# Patient Record
Sex: MALE
[De-identification: names, ages, dates, MRNs, and addresses within clinical notes are randomized per-mention and may not be internally consistent; named-entity substitution may affect disease eponyms.]

---

## 2018-02-10 ENCOUNTER — HOSPITAL ENCOUNTER (EMERGENCY)
Dept: HOSPITAL 45 - C.EDB | Age: 23
Discharge: HOME | End: 2018-02-10
Payer: COMMERCIAL

## 2018-02-10 VITALS — OXYGEN SATURATION: 97 % | HEART RATE: 95 BPM | SYSTOLIC BLOOD PRESSURE: 111 MMHG | DIASTOLIC BLOOD PRESSURE: 81 MMHG

## 2018-02-10 VITALS
WEIGHT: 190.92 LBS | BODY MASS INDEX: 29.97 KG/M2 | HEIGHT: 67.01 IN | WEIGHT: 190.92 LBS | BODY MASS INDEX: 29.97 KG/M2 | HEIGHT: 67.01 IN

## 2018-02-10 VITALS — OXYGEN SATURATION: 97 % | TEMPERATURE: 98.24 F

## 2018-02-10 DIAGNOSIS — S09.90XA: ICD-10-CM
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
## 2018-02-10 DIAGNOSIS — F10.129: Primary | ICD-10-CM

## 2018-02-10 DIAGNOSIS — S00.83XA: ICD-10-CM

## 2018-02-10 LAB
BUN SERPL-MCNC: 12 MG/DL (ref 7–18)
CALCIUM SERPL-MCNC: 8.2 MG/DL (ref 8.5–10.1)
CO2 SERPL-SCNC: 26 MMOL/L (ref 21–32)
CREAT SERPL-MCNC: 0.84 MG/DL (ref 0.6–1.4)
GLUCOSE SERPL-MCNC: 112 MG/DL (ref 70–99)
POTASSIUM SERPL-SCNC: 3.3 MMOL/L (ref 3.5–5.1)
SODIUM SERPL-SCNC: 140 MMOL/L (ref 136–145)

## 2018-02-10 NOTE — DIAGNOSTIC IMAGING REPORT
HEAD WITHOUT CONTRAST (CT)



CLINICAL HISTORY: 22 years-old Male presenting with Heavily intoxicated with

bruising/abrasion to right forehead, possible head injury. 



TECHNIQUE: Multidetector CT imaging of the head was performed without the use of

intravenous contrast. IV contrast: None. A dose lowering technique was used

consistent with the principles of ALARA (as low as reasonably achievable). 



COMPARISON: 8/25/2016.



CT DOSE (mGy.cm): The estimated cumulative dose is 614.27 mGy.cm.



FINDINGS: 



 topogram: Unremarkable.



Ventricles and sulci normal in size. Brain parenchyma normal in appearance with

preserved gray-white differentiation. No mass effect or midline shift. No

hemorrhage or acute territorial infarct. No extra-axial fluid collection.

Mucosal thickening in the right maxillary sinus with aerated secretions,

partially visualized. Minimal right frontal soft tissue swelling and subgaleal

fluid, possible diminutive hematoma.



IMPRESSION:

1.  No acute intracranial abnormality. 



2.  Minimal superficial contusion and hematoma in the right frontal region.



3.  Possible acute right maxillary sinusitis evidence by aerated secretions,

only partially visualized on the CT head. Correlate clinically.







Electronically signed by:  Stanislaw Casanova M.D.

2/10/2018 6:59 AM



Dictated Date/Time:  2/10/2018 6:55 AM

## 2018-02-10 NOTE — EMERGENCY ROOM VISIT NOTE
History


Report prepared by Gerardo:  Brent Hernández


Under the Supervision of:  Dr. Roe Robbins M.D.


First contact with patient:  03:31


Chief Complaint:  ALCOHOL OVERDOSE


Stated Complaint:  ALCOHOL OVERDOSE





History of Present Illness


HPI is limited due to altered mental state secondary to alcohol intoxication. 

The patient is a 22 year old male who presents to the Emergency Room with 

complaints of a recent alcohol overdose. Nurse states that the patient was 

found heavily intoxicated stumbling down a hallway. Patient states that he only 

drank alcohol tonight.





   Source of History:  patient


   History Limited By:  AMS (Secondary to alcohol intoxication)





Review of Systems


ROS is limited due to altered mental state secondary to alcohol intoxication.





Past Medical & Surgical


Medical Problems:


(1) No significant active problems





Past medical and surgical history is limited due to altered mental state 

secondary to alcohol intoxication.





Family History


Family history is limited due to altered mental state secondary to alcohol 

intoxication.





Social History


Smoking Status:  Unknown if Ever Smoked


Alcohol Use:  occasionally


Housing Status:  lives with roommate


Occupation Status:  [employer] student





Current/Historical Medications


No Active Prescriptions or Reported Meds





Allergies


Coded Allergies:  


     No Known Allergies (Unverified , 2/10/18)





Physical Exam


Vital Signs











  Date Time  Temp Pulse Resp B/P (MAP) Pulse Ox O2 Delivery O2 Flow Rate FiO2


 


2/10/18 09:20  95 16 111/81 97   


 


2/10/18 08:37  99 16 109/88 96 Room Air  


 


2/10/18 07:27  103      


 


2/10/18 07:25  99 16 121/95 94 Room Air  


 


2/10/18 06:31    103/64    


 


2/10/18 06:11  100 15  93   


 


2/10/18 06:00    119/77    


 


2/10/18 05:41  93 17  94   


 


2/10/18 05:30    116/67    


 


2/10/18 05:11   22     


 


2/10/18 05:00    124/75    


 


2/10/18 04:41  97 14  96   


 


2/10/18 04:36    100/55    


 


2/10/18 04:01    108/51    


 


2/10/18 03:58  89 16  93   


 


2/10/18 03:40  102      


 


2/10/18 03:37    134/85    


 


2/10/18 03:30     97   


 


2/10/18 03:30 36.8 102 16 134/85 97 Room Air  











Physical Exam


GENERAL: Patient is heavily intoxicated.  Smells of alcohol.  Well appearing 

and in no acute distress.


HEAD: Bruising abrasion on the right forehead down to the right lateral eyebrow.


EYES: Injected conjunctiva.  Normal EOM.  Pupils equal/reactive.


ENT: Mucous membranes moist, no nasal congestion, .


NECK: No step-offs, no adenopathy, no meningismus, trachea is midline.


LUNGS: No dyspnea. Clear to auscultation and equal bilaterally. No wheeze, no 

rhonchi.


HEART: Regular rate and rhythm.  No murmurs, rubs, gallops appreciated.


ABDOMEN: Soft, nontender, bowel sounds positive, no masses appreciated, no 

peritonitis.


BACK: No midline tenderness, no CVA tenderness


EXTREMITIES: Abrasion over the right elbow. Normal motion all extremities, no 

cyanosis, no edema.


NEUROLOGIC: Intoxicated. Slurred speech. No acute motor or sensory deficits, no 

focal weakness, cranial nerves grossly intact.


SKIN: No rash, no jaundice, no diaphoresis.





Medical Decision & Procedures


ER Provider


Diagnostic Interpretation:


Radiology results and stated below per my review and radiologist interpretation:





HEAD WITHOUT CONTRAST (CT)





CLINICAL HISTORY: 22 years-old Male presenting with Heavily intoxicated with


bruising/abrasion to right forehead, possible head injury. 





TECHNIQUE: Multidetector CT imaging of the head was performed without the use of


intravenous contrast. IV contrast: None. A dose lowering technique was used


consistent with the principles of ALARA (as low as reasonably achievable). 





COMPARISON: 8/25/2016.





CT DOSE (mGy.cm): The estimated cumulative dose is 614.27 mGy.cm.





FINDINGS: 





 topogram: Unremarkable.





Ventricles and sulci normal in size. Brain parenchyma normal in appearance with


preserved gray-white differentiation. No mass effect or midline shift. No


hemorrhage or acute territorial infarct. No extra-axial fluid collection.


Mucosal thickening in the right maxillary sinus with aerated secretions,


partially visualized. Minimal right frontal soft tissue swelling and subgaleal


fluid, possible diminutive hematoma.





IMPRESSION:


1.  No acute intracranial abnormality. 





2.  Minimal superficial contusion and hematoma in the right frontal region.





3.  Possible acute right maxillary sinusitis evidence by aerated secretions,


only partially visualized on the CT head. Correlate clinically.





Electronically signed by:  Stanislaw Casanova M.D.


2/10/2018 6:59 AM





Laboratory Results


2/10/18 03:36

















Test


  2/10/18


03:36


 


Anion Gap


  6.0 mmol/L


(3-11)


 


Est Creatinine Clear Calc


Drug Dose 145.0 ml/min 


 


 


Estimated GFR (


American) 144.0 


 


 


Estimated GFR (Non-


American 124.3 


 


 


BUN/Creatinine Ratio 13.7 (10-20) 


 


Calcium Level


  8.2 mg/dl


(8.5-10.1)


 


Ethyl Alcohol mg/dL


  353.0 mg/dl


(0-3)





Laboratory results as reviewed by me.





ED Course


0330: The patient was evaluated in room B11B. A complete history and physical 

exam was performed.





[]: Reevaluated the patient. Discussed results and discharge instructions. He 

verbalized understanding and agreement. The patient is ready for discharge.





Medical Decision


Differential: Alcohol Intoxication, Drug Intoxication, Electrolyte Abnormality, 

Trauma, Intracranial Event, Toxicological, Excited Delirium, Serotonin Syndrome

, amongst other pathologies entertained.





Heavily intoxicated 22 yr old male brought in by EMS after he was found 

stumbling downtown.  No reported trauma through there is abrasion/bruising over 

right forehead thus CT done without evidence ICH/Fracture.  Protecting airway 

and breathing comfortably throughout ED stay.  EtOH positive.  Monitored and 

discharged when awake, alert, oriented and denies any complaints.





Head Trauma


GCS Score:  12 (Initially secondary to severe intoxication)





Medication Reconcilliation


Current Medication List:  was personally reviewed by me





Blood Pressure Screening


Patient's blood pressure:  Normal blood pressure


Blood pressure disposition:  Did not require urgent referral





Impression





 Primary Impression:  


 Alcohol abuse


 Additional Impressions:  


 Alcohol intoxication


 Head injury, closed





Scribe Attestation


The scribe's documentation has been prepared under my direction and personally 

reviewed by me in its entirety. I confirm that the note above accurately 

reflects all work, treatment, procedures, and medical decision making performed 

by me.





Departure Information


Dispostion


Home / Self-Care





Prescriptions





No Active Prescriptions or Reported Meds





Referrals


No Doctor, Assigned (PCP)





Patient Instructions


[facility]





Additional Instructions





You were evaluated in emergency department for intoxication.  This is a sign of 

Alcohol Abuse and should not be taken lightly.  You had a blood alcohol level 

that was significantly elevated.





Given your severe level of intoxication and evidence of bruising to your head 

it was felt that CT Scan of your head was needed which fortunately did not find 

any bleeding on your brain nor skull fracture.  You may have a concussion thus 

avoid further head injury or alcohol.  Follow up with primary provider or S 

for repeat evaluation if headache continue.





Over the next 24 hours keep well hydrated and eat light meals.  


Don't drink any more alcohol.  This is important.


Please discuss this visit with your Primary Care Provider, [facility] and/or your loved ones. 





Unless an exceptional circumstance, the Hospital DOES NOT contact anyone DURING 

your visit, nor is your Protected Medical Information released to anyone 

without your approval/request.  This means we do not contact your Parents, the 

Police, etc.  However, you will likely receive a bill from the Hospital and/or 

your Insurance company, which will usually be sent to the Primary Policy Walker 

(often one's Parents).  Furthermore, as a student, your visit report will 

likely be sent to [facility] as your primary care provider, 

unless other Provider listed.





If your incident was on campus, or if the Police were involved, they will often 

contact the University to make them aware of what happened.  Often this will 

result in you being required to take Alcohol Education classes (ie BASICS class)

.  Please see information given to you at discharge regarding contact for this.





If the Police were involved you will likely be cited for public intoxication.  

Please contact either [facility] Police or the [place] Police for 

further information.





Call 911 or return to Emergency Department if you develop:


Passing out, difficulty breathing, many episodes of vomiting, blood in vomit or 

stool, abdominal pain, fevers, or other severe symptoms.





We are always here to help if you feel you need further evaluation or treatment.





Problem Qualifiers

## 2018-10-19 ENCOUNTER — EMERGENCY (EMERGENCY)
Facility: HOSPITAL | Age: 23
LOS: 1 days | Discharge: HOME | End: 2018-10-19
Attending: EMERGENCY MEDICINE | Admitting: EMERGENCY MEDICINE
Payer: COMMERCIAL

## 2018-10-19 VITALS
TEMPERATURE: 99 F | HEART RATE: 117 BPM | OXYGEN SATURATION: 99 % | RESPIRATION RATE: 18 BRPM | SYSTOLIC BLOOD PRESSURE: 126 MMHG | DIASTOLIC BLOOD PRESSURE: 85 MMHG

## 2018-10-19 VITALS
TEMPERATURE: 99 F | SYSTOLIC BLOOD PRESSURE: 123 MMHG | OXYGEN SATURATION: 100 % | RESPIRATION RATE: 20 BRPM | HEART RATE: 145 BPM | DIASTOLIC BLOOD PRESSURE: 68 MMHG | WEIGHT: 130.07 LBS

## 2018-10-19 DIAGNOSIS — R41.82 ALTERED MENTAL STATUS, UNSPECIFIED: ICD-10-CM
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
## 2018-10-19 DIAGNOSIS — F10.120 ALCOHOL ABUSE WITH INTOXICATION, UNCOMPLICATED: ICD-10-CM
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
## 2018-10-19 DIAGNOSIS — S00.81XA ABRASION OF OTHER PART OF HEAD, INITIAL ENCOUNTER: ICD-10-CM

## 2018-10-19 DIAGNOSIS — Z23 ENCOUNTER FOR IMMUNIZATION: ICD-10-CM

## 2018-10-19 LAB
ANION GAP SERPL CALC-SCNC: 13 MMOL/L — SIGNIFICANT CHANGE UP (ref 5–17)
BUN SERPL-MCNC: 7 MG/DL — SIGNIFICANT CHANGE UP (ref 7–23)
CALCIUM SERPL-MCNC: 8.2 MG/DL — LOW (ref 8.4–10.5)
CHLORIDE SERPL-SCNC: 108 MMOL/L — SIGNIFICANT CHANGE UP (ref 96–108)
CO2 SERPL-SCNC: 24 MMOL/L — SIGNIFICANT CHANGE UP (ref 22–31)
CREAT SERPL-MCNC: 0.74 MG/DL — SIGNIFICANT CHANGE UP (ref 0.5–1.3)
GLUCOSE SERPL-MCNC: 115 MG/DL — HIGH (ref 70–99)
PCP SPEC-MCNC: SIGNIFICANT CHANGE UP
POTASSIUM SERPL-MCNC: 3.8 MMOL/L — SIGNIFICANT CHANGE UP (ref 3.5–5.3)
POTASSIUM SERPL-SCNC: 3.8 MMOL/L — SIGNIFICANT CHANGE UP (ref 3.5–5.3)
SODIUM SERPL-SCNC: 145 MMOL/L — SIGNIFICANT CHANGE UP (ref 135–145)

## 2018-10-19 PROCEDURE — 70450 CT HEAD/BRAIN W/O DYE: CPT | Mod: 26

## 2018-10-19 PROCEDURE — 99285 EMERGENCY DEPT VISIT HI MDM: CPT | Mod: 25

## 2018-10-19 PROCEDURE — 80307 DRUG TEST PRSMV CHEM ANLYZR: CPT

## 2018-10-19 PROCEDURE — 96360 HYDRATION IV INFUSION INIT: CPT

## 2018-10-19 PROCEDURE — 80048 BASIC METABOLIC PNL TOTAL CA: CPT

## 2018-10-19 PROCEDURE — 93005 ELECTROCARDIOGRAM TRACING: CPT

## 2018-10-19 PROCEDURE — 99284 EMERGENCY DEPT VISIT MOD MDM: CPT | Mod: 25

## 2018-10-19 PROCEDURE — 72125 CT NECK SPINE W/O DYE: CPT

## 2018-10-19 PROCEDURE — 90715 TDAP VACCINE 7 YRS/> IM: CPT

## 2018-10-19 PROCEDURE — 72125 CT NECK SPINE W/O DYE: CPT | Mod: 26

## 2018-10-19 PROCEDURE — 82962 GLUCOSE BLOOD TEST: CPT

## 2018-10-19 PROCEDURE — 70450 CT HEAD/BRAIN W/O DYE: CPT

## 2018-10-19 PROCEDURE — 96361 HYDRATE IV INFUSION ADD-ON: CPT

## 2018-10-19 PROCEDURE — 90471 IMMUNIZATION ADMIN: CPT

## 2018-10-19 RX ORDER — ACETAMINOPHEN 500 MG
975 TABLET ORAL ONCE
Qty: 0 | Refills: 0 | Status: COMPLETED | OUTPATIENT
Start: 2018-10-19 | End: 2018-10-19

## 2018-10-19 RX ORDER — SODIUM CHLORIDE 9 MG/ML
1000 INJECTION INTRAMUSCULAR; INTRAVENOUS; SUBCUTANEOUS ONCE
Qty: 0 | Refills: 0 | Status: COMPLETED | OUTPATIENT
Start: 2018-10-19 | End: 2018-10-19

## 2018-10-19 RX ORDER — BACITRACIN ZINC 500 UNIT/G
1 OINTMENT IN PACKET (EA) TOPICAL ONCE
Qty: 0 | Refills: 0 | Status: COMPLETED | OUTPATIENT
Start: 2018-10-19 | End: 2018-10-19

## 2018-10-19 RX ORDER — TETANUS TOXOID, REDUCED DIPHTHERIA TOXOID AND ACELLULAR PERTUSSIS VACCINE, ADSORBED 5; 2.5; 8; 8; 2.5 [IU]/.5ML; [IU]/.5ML; UG/.5ML; UG/.5ML; UG/.5ML
0.5 SUSPENSION INTRAMUSCULAR ONCE
Qty: 0 | Refills: 0 | Status: COMPLETED | OUTPATIENT
Start: 2018-10-19 | End: 2018-10-19

## 2018-10-19 RX ORDER — SODIUM CHLORIDE 9 MG/ML
1000 INJECTION, SOLUTION INTRAVENOUS ONCE
Qty: 0 | Refills: 0 | Status: COMPLETED | OUTPATIENT
Start: 2018-10-19 | End: 2018-10-19

## 2018-10-19 RX ADMIN — SODIUM CHLORIDE 1000 MILLILITER(S): 9 INJECTION INTRAMUSCULAR; INTRAVENOUS; SUBCUTANEOUS at 06:35

## 2018-10-19 RX ADMIN — SODIUM CHLORIDE 1000 MILLILITER(S): 9 INJECTION INTRAMUSCULAR; INTRAVENOUS; SUBCUTANEOUS at 05:36

## 2018-10-19 RX ADMIN — SODIUM CHLORIDE 1000 MILLILITER(S): 9 INJECTION INTRAMUSCULAR; INTRAVENOUS; SUBCUTANEOUS at 03:55

## 2018-10-19 RX ADMIN — Medication 1 APPLICATION(S): at 04:15

## 2018-10-19 RX ADMIN — SODIUM CHLORIDE 2000 MILLILITER(S): 9 INJECTION, SOLUTION INTRAVENOUS at 09:09

## 2018-10-19 RX ADMIN — SODIUM CHLORIDE 1000 MILLILITER(S): 9 INJECTION INTRAMUSCULAR; INTRAVENOUS; SUBCUTANEOUS at 04:17

## 2018-10-19 RX ADMIN — SODIUM CHLORIDE 1000 MILLILITER(S): 9 INJECTION INTRAMUSCULAR; INTRAVENOUS; SUBCUTANEOUS at 04:49

## 2018-10-19 RX ADMIN — SODIUM CHLORIDE 1000 MILLILITER(S): 9 INJECTION INTRAMUSCULAR; INTRAVENOUS; SUBCUTANEOUS at 07:27

## 2018-10-19 RX ADMIN — SODIUM CHLORIDE 1000 MILLILITER(S): 9 INJECTION INTRAMUSCULAR; INTRAVENOUS; SUBCUTANEOUS at 05:35

## 2018-10-19 RX ADMIN — TETANUS TOXOID, REDUCED DIPHTHERIA TOXOID AND ACELLULAR PERTUSSIS VACCINE, ADSORBED 0.5 MILLILITER(S): 5; 2.5; 8; 8; 2.5 SUSPENSION INTRAMUSCULAR at 04:15

## 2018-10-19 NOTE — ED PROVIDER NOTE - OBJECTIVE STATEMENT
23M no PMH BIBEMS for alcohol intoxication.  per EMS pt found with abrasion to forehead.  pt states drank too much.  no vomiting.

## 2018-10-19 NOTE — ED ADULT NURSE REASSESSMENT NOTE - NS ED NURSE REASSESS COMMENT FT1
pt. appears more alert, less anxious, states he has reached a friend by cell, who is en route, vss as noted, assessment on-going, pending further orders

## 2018-10-19 NOTE — ED ADULT TRIAGE NOTE - ARRIVAL INFO ADDITIONAL COMMENTS
found [address], pt states "I drank too much", noted with abrasion and hematoma to left side forehead found on [address], pt states "I drank too much", noted with abrasion and hematoma to left side forehead

## 2018-10-19 NOTE — ED ADULT NURSE NOTE - OBJECTIVE STATEMENT
pt. BIBA after having been [address], stating "I've drank too much," as reported per EMS, pt. noted to have abrasions to bl forehead, largeer on lt., denies having fallen or loc, no other injuries appreciated on PE, NAD at present, denies SI/HI, or any other complaint at present, no focal neuro deficits appreciated

## 2018-10-19 NOTE — ED ADULT NURSE REASSESSMENT NOTE - NS ED NURSE REASSESS COMMENT FT1
ER Tech. at bedside for 12 Lead, pt. with intermittent episodes of tearfulness, states "I'm just scared"

## 2018-10-19 NOTE — ED ADULT NURSE NOTE - NSIMPLEMENTINTERV_GEN_ALL_ED
Implemented All Fall Risk Interventions:  [place] to call system. Call bell, personal items and telephone within reach. Instruct patient to call for assistance. Room bathroom lighting operational. Non-slip footwear when patient is off stretcher. Physically safe environment: no spills, clutter or unnecessary equipment. Stretcher in lowest position, wheels locked, appropriate side rails in place. Provide visual cue, wrist band, yellow gown, etc. Monitor gait and stability. Monitor for mental status changes and reorient to person, place, and time. Review medications for side effects contributing to fall risk. Reinforce activity limits and safety measures with patient and family.

## 2020-01-01 ENCOUNTER — EMERGENCY (EMERGENCY)
Facility: HOSPITAL | Age: 25
LOS: 1 days | Discharge: HOME | End: 2020-01-01
Attending: EMERGENCY MEDICINE | Admitting: EMERGENCY MEDICINE
Payer: COMMERCIAL

## 2020-01-01 VITALS
RESPIRATION RATE: 16 BRPM | DIASTOLIC BLOOD PRESSURE: 82 MMHG | OXYGEN SATURATION: 100 % | TEMPERATURE: 98 F | WEIGHT: 190.04 LBS | HEIGHT: 67 IN | SYSTOLIC BLOOD PRESSURE: 135 MMHG | HEART RATE: 101 BPM

## 2020-01-01 VITALS
DIASTOLIC BLOOD PRESSURE: 87 MMHG | SYSTOLIC BLOOD PRESSURE: 145 MMHG | RESPIRATION RATE: 18 BRPM | OXYGEN SATURATION: 99 % | TEMPERATURE: 99 F

## 2020-01-01 DIAGNOSIS — R25.1 TREMOR, UNSPECIFIED: ICD-10-CM

## 2020-01-01 DIAGNOSIS — F10.239 ALCOHOL DEPENDENCE WITH WITHDRAWAL, UNSPECIFIED: ICD-10-CM
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
## 2020-01-01 LAB
ALBUMIN SERPL ELPH-MCNC: 5.1 G/DL — HIGH (ref 3.3–5)
ALP SERPL-CCNC: 140 U/L — HIGH (ref 40–120)
ALT FLD-CCNC: 87 U/L — HIGH (ref 10–45)
ANION GAP SERPL CALC-SCNC: 12 MMOL/L — SIGNIFICANT CHANGE UP (ref 5–17)
APPEARANCE UR: CLEAR — SIGNIFICANT CHANGE UP
AST SERPL-CCNC: 42 U/L — HIGH (ref 10–40)
BASOPHILS # BLD AUTO: 0.03 K/UL — SIGNIFICANT CHANGE UP (ref 0–0.2)
BASOPHILS NFR BLD AUTO: 0.4 % — SIGNIFICANT CHANGE UP (ref 0–2)
BILIRUB SERPL-MCNC: 0.4 MG/DL — SIGNIFICANT CHANGE UP (ref 0.2–1.2)
BILIRUB UR-MCNC: NEGATIVE — SIGNIFICANT CHANGE UP
BUN SERPL-MCNC: 8 MG/DL — SIGNIFICANT CHANGE UP (ref 7–23)
CALCIUM SERPL-MCNC: 9.9 MG/DL — SIGNIFICANT CHANGE UP (ref 8.4–10.5)
CHLORIDE SERPL-SCNC: 102 MMOL/L — SIGNIFICANT CHANGE UP (ref 96–108)
CO2 SERPL-SCNC: 25 MMOL/L — SIGNIFICANT CHANGE UP (ref 22–31)
COLOR SPEC: YELLOW — SIGNIFICANT CHANGE UP
CREAT SERPL-MCNC: 0.66 MG/DL — SIGNIFICANT CHANGE UP (ref 0.5–1.3)
DIFF PNL FLD: NEGATIVE — SIGNIFICANT CHANGE UP
EOSINOPHIL # BLD AUTO: 0.04 K/UL — SIGNIFICANT CHANGE UP (ref 0–0.5)
EOSINOPHIL NFR BLD AUTO: 0.5 % — SIGNIFICANT CHANGE UP (ref 0–6)
ETHANOL SERPL-MCNC: <10 MG/DL — SIGNIFICANT CHANGE UP (ref 0–10)
GLUCOSE SERPL-MCNC: 93 MG/DL — SIGNIFICANT CHANGE UP (ref 70–99)
GLUCOSE UR QL: NEGATIVE — SIGNIFICANT CHANGE UP
HCT VFR BLD CALC: 47.9 % — SIGNIFICANT CHANGE UP (ref 39–50)
HGB BLD-MCNC: 16.1 G/DL — SIGNIFICANT CHANGE UP (ref 13–17)
IMM GRANULOCYTES NFR BLD AUTO: 0.4 % — SIGNIFICANT CHANGE UP (ref 0–1.5)
KETONES UR-MCNC: NEGATIVE — SIGNIFICANT CHANGE UP
LEUKOCYTE ESTERASE UR-ACNC: NEGATIVE — SIGNIFICANT CHANGE UP
LYMPHOCYTES # BLD AUTO: 1.39 K/UL — SIGNIFICANT CHANGE UP (ref 1–3.3)
LYMPHOCYTES # BLD AUTO: 17.8 % — SIGNIFICANT CHANGE UP (ref 13–44)
MAGNESIUM SERPL-MCNC: 2.2 MG/DL — SIGNIFICANT CHANGE UP (ref 1.6–2.6)
MCHC RBC-ENTMCNC: 29.9 PG — SIGNIFICANT CHANGE UP (ref 27–34)
MCHC RBC-ENTMCNC: 33.6 GM/DL — SIGNIFICANT CHANGE UP (ref 32–36)
MCV RBC AUTO: 88.9 FL — SIGNIFICANT CHANGE UP (ref 80–100)
MONOCYTES # BLD AUTO: 0.52 K/UL — SIGNIFICANT CHANGE UP (ref 0–0.9)
MONOCYTES NFR BLD AUTO: 6.6 % — SIGNIFICANT CHANGE UP (ref 2–14)
NEUTROPHILS # BLD AUTO: 5.81 K/UL — SIGNIFICANT CHANGE UP (ref 1.8–7.4)
NEUTROPHILS NFR BLD AUTO: 74.3 % — SIGNIFICANT CHANGE UP (ref 43–77)
NITRITE UR-MCNC: NEGATIVE — SIGNIFICANT CHANGE UP
NRBC # BLD: 0 /100 WBCS — SIGNIFICANT CHANGE UP (ref 0–0)
PH UR: 6 — SIGNIFICANT CHANGE UP (ref 5–8)
PLATELET # BLD AUTO: 251 K/UL — SIGNIFICANT CHANGE UP (ref 150–400)
POTASSIUM SERPL-MCNC: 4.6 MMOL/L — SIGNIFICANT CHANGE UP (ref 3.5–5.3)
POTASSIUM SERPL-SCNC: 4.6 MMOL/L — SIGNIFICANT CHANGE UP (ref 3.5–5.3)
PROT SERPL-MCNC: 9 G/DL — HIGH (ref 6–8.3)
PROT UR-MCNC: NEGATIVE MG/DL — SIGNIFICANT CHANGE UP
RBC # BLD: 5.39 M/UL — SIGNIFICANT CHANGE UP (ref 4.2–5.8)
RBC # FLD: 13 % — SIGNIFICANT CHANGE UP (ref 10.3–14.5)
SODIUM SERPL-SCNC: 139 MMOL/L — SIGNIFICANT CHANGE UP (ref 135–145)
SP GR SPEC: >=1.03 — SIGNIFICANT CHANGE UP (ref 1–1.03)
UROBILINOGEN FLD QL: 0.2 E.U./DL — SIGNIFICANT CHANGE UP
WBC # BLD: 7.82 K/UL — SIGNIFICANT CHANGE UP (ref 3.8–10.5)
WBC # FLD AUTO: 7.82 K/UL — SIGNIFICANT CHANGE UP (ref 3.8–10.5)

## 2020-01-01 PROCEDURE — 80307 DRUG TEST PRSMV CHEM ANLYZR: CPT

## 2020-01-01 PROCEDURE — 81003 URINALYSIS AUTO W/O SCOPE: CPT

## 2020-01-01 PROCEDURE — 70450 CT HEAD/BRAIN W/O DYE: CPT

## 2020-01-01 PROCEDURE — 80053 COMPREHEN METABOLIC PANEL: CPT

## 2020-01-01 PROCEDURE — 96375 TX/PRO/DX INJ NEW DRUG ADDON: CPT

## 2020-01-01 PROCEDURE — 96361 HYDRATE IV INFUSION ADD-ON: CPT

## 2020-01-01 PROCEDURE — 83735 ASSAY OF MAGNESIUM: CPT

## 2020-01-01 PROCEDURE — 99285 EMERGENCY DEPT VISIT HI MDM: CPT

## 2020-01-01 PROCEDURE — 82962 GLUCOSE BLOOD TEST: CPT

## 2020-01-01 PROCEDURE — 96374 THER/PROPH/DIAG INJ IV PUSH: CPT

## 2020-01-01 PROCEDURE — 36415 COLL VENOUS BLD VENIPUNCTURE: CPT

## 2020-01-01 PROCEDURE — 70450 CT HEAD/BRAIN W/O DYE: CPT | Mod: 26

## 2020-01-01 PROCEDURE — 85025 COMPLETE CBC W/AUTO DIFF WBC: CPT

## 2020-01-01 PROCEDURE — 99284 EMERGENCY DEPT VISIT MOD MDM: CPT | Mod: 25

## 2020-01-01 RX ORDER — SODIUM CHLORIDE 9 MG/ML
1000 INJECTION, SOLUTION INTRAVENOUS
Refills: 0 | Status: DISCONTINUED | OUTPATIENT
Start: 2020-01-01 | End: 2020-01-02

## 2020-01-01 RX ORDER — SODIUM CHLORIDE 9 MG/ML
1000 INJECTION INTRAMUSCULAR; INTRAVENOUS; SUBCUTANEOUS ONCE
Refills: 0 | Status: COMPLETED | OUTPATIENT
Start: 2020-01-01 | End: 2020-01-01

## 2020-01-01 RX ADMIN — Medication 1 MILLIGRAM(S): at 18:19

## 2020-01-01 RX ADMIN — SODIUM CHLORIDE 250 MILLILITER(S): 9 INJECTION, SOLUTION INTRAVENOUS at 19:02

## 2020-01-01 RX ADMIN — SODIUM CHLORIDE 1000 MILLILITER(S): 9 INJECTION INTRAMUSCULAR; INTRAVENOUS; SUBCUTANEOUS at 18:22

## 2020-01-01 RX ADMIN — SODIUM CHLORIDE 1000 MILLILITER(S): 9 INJECTION INTRAMUSCULAR; INTRAVENOUS; SUBCUTANEOUS at 20:20

## 2020-01-01 NOTE — ED PROVIDER NOTE - OBJECTIVE STATEMENT
23 y/o M with PMHx of HTN, right testicular cancer, inflamed liver and EtOH abuse presenting to the ED for alcohol withdrawal symptoms. Pt reports that he is a social drinker, drinking every 3 weeks but binge drinks. Pt endorses having 16+ drinks last night with a mix of different liquors, last drink at 5AM. 2 hours PTA, pt began experiencing shaking which pt states is typical to his prior withdrawal episodes. Pt has had 2 prior withdrawal episodes for which he was seen at [facility] and discharged, last one 1 month ago. Pt reports that he typically has tremors which worsen, hot flashes, difficulty sleeping, anxiety and labored breathing. Currently pt only reporting tremors. Denies any hallucinations, nausea, vomiting, hx of DT or pancreatitis. 23 y/o M with PMHx of HTN, right testicular cancer s/p orchiectomy, inflamed liver, EtOH abuse presenting to the ED for alcohol withdrawal symptoms. Pt reports that he is a social drinker, drinking every 3 weeks but binge drinks. Pt endorses having 16+ drinks last night with a mix of different liquors, last drink at 5AM. 2 hours PTA, pt began experiencing shaking which pt states is typical of early withdrawal. Pt has had 2 prior withdrawal episodes for which he was seen at [facility] and discharged, last time 1 month ago. Denies any hallucinations, sweating, ha, nausea, vomiting, abd pain, diarrhea, cp, sob, palp, dizziness... No hx of DT or pancreatitis.

## 2020-01-01 NOTE — ED ADULT NURSE NOTE - OBJECTIVE STATEMENT
Pt presents reporting episode of binge drinking last night, states he consumed 16 drinks of multiple kinds. Pt reports last drink at 5am. Pt reports he has approximately 1 episode of binge drinking every 3 weeks without drinking in between episodes. PT presents with tremors, especially to the right arm. Pt reports he last required an ER visit for drinking 3 weeks ago. Pt reports he has made multiple appouintments for detox consultation but has not kept them, pt given [place] detox resource sheet.

## 2020-01-01 NOTE — ED PROVIDER NOTE - CLINICAL SUMMARY MEDICAL DECISION MAKING FREE TEXT BOX
Impression: Acute EtOH withdrawal with associated tachycardia. Vital signs otherwise stable. Will check labs, hydrate with banana bag and give Ativan for tremors. Will also obtain CT head for worsening tremors on right side compared to left. Impression: mild EtOH withdrawal with associated mild tachycardia. Vital signs otherwise stable. CIWA score 3. Will check labs, hydrate with banana bag and give Ativan for tremors. Will also obtain CT head given worsening tremors on right side compared to left.    Labs reviewed with findings of normal wbc, renal function and electrolytes. + mildly elev lfts likely 2/2 etoh use. CT head neg for acute bleed/ infarct. Pt given ativan and hydrated with banana bag with improvement of sx's. ED evaluation and management discussed with the patient in detail.  Close PMD follow up encouraged.  Strict ED return instructions discussed in detail and patient given the opportunity to ask any questions about their discharge diagnosis and instructions. Patient verbalized understanding.

## 2020-01-01 NOTE — ED PROVIDER NOTE - PHYSICAL EXAMINATION
VITAL SIGNS: I have reviewed nursing notes and confirm.  CONSTITUTIONAL: Well-developed; well-nourished; in no acute distress.   SKIN:  warm and dry, no acute rash.   HEAD:  normocephalic, atraumatic.  EYES: PERRL, EOM intact; conjunctiva and sclera clear. Positive tongue fasciculations.   ENT: No nasal discharge; airway clear.   NECK: Supple; non tender.  CARD: S1, S2 normal; no murmurs, gallops, or rubs. Regular rate and rhythm.   RESP:  Clear to auscultation b/l, no wheezes, rales or rhonchi.  ABD: Normal bowel sounds; soft; non-distended; non-tender; no guarding/ rebound.  EXT: Normal ROM. No clubbing, cyanosis or edema. 2+ pulses to b/l ue/le.  NEURO: AOx3, CN2-12 intact. Positive tremors to b/l UE and LE, R>L. Motor and sensations intact b/l. Negative pronator drift.   PSYCH: Cooperative, mood and affect appropriate. VITAL SIGNS: I have reviewed nursing notes and confirm.  CONSTITUTIONAL: Well-developed; well-nourished; in no acute distress.   SKIN:  warm and dry, no acute rash.   HEAD:  normocephalic, atraumatic.  EYES: PERRL, EOM intact; conjunctiva and sclera clear. Mild tongue fasciculations.   ENT: No nasal discharge; airway clear.   NECK: Supple; non tender.  CARD: S1, S2 normal; no murmurs, gallops, or rubs. Regular rate and rhythm.   RESP:  Clear to auscultation b/l, no wheezes, rales or rhonchi.  ABD: Normal bowel sounds; soft; non-distended; non-tender; no guarding/ rebound.  EXT: Normal ROM. No clubbing, cyanosis or edema. 2+ pulses to b/l ue/le.  NEURO: AOx3, CN2-12 intact. Positive tremors to b/l UE and LE, R>L. Motor and sensations intact b/l. Negative pronator drift.   PSYCH: Cooperative, mood and affect appropriate.

## 2020-01-01 NOTE — ED ADULT TRIAGE NOTE - ARRIVAL INFO ADDITIONAL COMMENTS
states he binge drinks alcohol. drinks once every 3 weeks but had more than 20 drinks last night. states he woke up with "the shakes". no tremors or tongue fasciculations noted.

## 2020-01-01 NOTE — ED PROVIDER NOTE - PATIENT PORTAL LINK FT
You can access the FollowMyHealth Patient Portal offered by [facility] by registering at the following website: [email or web address]. By joining [employer]’s FollowMyHealth portal, you will also be able to view your health information using other applications (apps) compatible with our system.

## 2020-01-01 NOTE — ED PROVIDER NOTE - NSFOLLOWUPINSTRUCTIONS_ED_ALL_ED_FT
Drink plenty of fluids. Curb your alcohol intake. Consider detox program for alcohol abuse. Follow up with your primary care physician for re-evaluation. Return to er for any new or worsening symptoms.       Log Out.    SHADO CareNotes®     :  [facility]             ALCOHOL USE DISORDER - AfterCare(R) Instructions(ER/ED)     Alcohol Use Disorder    WHAT YOU NEED TO KNOW:    Alcohol use disorder (AUD) is problem drinking. AUD includes alcohol abuse and alcohol dependence. Alcohol can damage your brain, heart, kidneys, lungs, and liver. Your risk of stroke is greater if you have 5 or more drinks each day. If you are pregnant, you and your baby are at risk for serious health problems. No amount of alcohol is safe during pregnancy.    DISCHARGE INSTRUCTIONS:    Call your local emergency number (911 in the [place]) if:     You have seizures.        Call your doctor if:     Your heart is beating faster than usual.      You have hallucinations.      You cannot remember what happens while you are drinking.      You are anxious and have nausea.      Your hands are shaky and you are sweating heavily.      You have questions or concerns about your condition or care.    Follow up with your healthcare provider as directed: Do not try to stop drinking on your own. Your healthcare provider may need to help you withdraw from alcohol safely. He or she may need to admit you to the hospital. You may also need any of the following:    Medicines to decrease your craving for alcohol      Support groups such as Alcoholics Anonymous       Therapy from a psychiatrist or psychologist       Admission to an inpatient facility for treatment for severe AUD    For support and more information:     Substance Abuse and Mental Health Services Administration  [address]  [place] [address]  Web Address: http://www.sama.gov      Alcoholics Anonymous  Web Address: http://www.aa.org           © Copyright [employer] 2020       back to top                      © Copyright [employer] 2020

## 2020-01-01 NOTE — ED ADULT NURSE NOTE - CHPI ED NUR SYMPTOMS NEG
no fever/no vomiting/no pain/no chills/no decreased eating/drinking/no nausea/no weakness/no tingling

## 2020-02-01 ENCOUNTER — OUTPATIENT (OUTPATIENT)
Dept: OUTPATIENT SERVICES | Facility: HOSPITAL | Age: 25
LOS: 1 days | End: 2020-02-01
Payer: MEDICAID

## 2020-02-01 PROCEDURE — G9001: CPT

## 2020-02-28 DIAGNOSIS — Z71.89 OTHER SPECIFIED COUNSELING: ICD-10-CM

## 2020-02-28 PROBLEM — C62.90 MALIGNANT NEOPLASM OF UNSPECIFIED TESTIS, UNSPECIFIED WHETHER DESCENDED OR UNDESCENDED: Chronic | Status: ACTIVE | Noted: 2020-01-01

## 2020-02-28 PROBLEM — F10.239 ALCOHOL DEPENDENCE WITH WITHDRAWAL, UNSPECIFIED: Chronic | Status: ACTIVE | Noted: 2020-01-01
